# Patient Record
Sex: MALE | Race: WHITE
[De-identification: names, ages, dates, MRNs, and addresses within clinical notes are randomized per-mention and may not be internally consistent; named-entity substitution may affect disease eponyms.]

---

## 2017-12-20 ENCOUNTER — HOSPITAL ENCOUNTER (OUTPATIENT)
Dept: HOSPITAL 53 - M WUC | Age: 57
End: 2017-12-20
Attending: FAMILY MEDICINE
Payer: COMMERCIAL

## 2017-12-20 DIAGNOSIS — N40.0: ICD-10-CM

## 2017-12-20 DIAGNOSIS — Z98.84: ICD-10-CM

## 2017-12-20 DIAGNOSIS — E05.90: ICD-10-CM

## 2017-12-20 DIAGNOSIS — D50.9: ICD-10-CM

## 2017-12-20 DIAGNOSIS — E55.9: Primary | ICD-10-CM

## 2017-12-20 LAB
ALBUMIN SERPL BCG-MCNC: 3.8 GM/DL (ref 3.2–5.2)
ALBUMIN/GLOB SERPL: 1 {RATIO} (ref 1–1.93)
ALP SERPL-CCNC: 59 U/L (ref 45–117)
ALT SERPL W P-5'-P-CCNC: 30 U/L (ref 12–78)
ANION GAP SERPL CALC-SCNC: 3 MEQ/L (ref 8–16)
AST SERPL-CCNC: 24 U/L (ref 7–37)
BASOPHILS # BLD AUTO: 0 10^3/UL (ref 0–0.2)
BASOPHILS NFR BLD AUTO: 0.9 % (ref 0–1)
BILIRUB SERPL-MCNC: 0.5 MG/DL (ref 0.2–1)
BUN SERPL-MCNC: 11 MG/DL (ref 7–18)
CALCIUM SERPL-MCNC: 8.8 MG/DL (ref 8.5–10.1)
CHLORIDE SERPL-SCNC: 103 MEQ/L (ref 98–107)
CHOLEST SERPL-MCNC: 154 MG/DL (ref ?–200)
CO2 SERPL-SCNC: 34 MEQ/L (ref 21–32)
CREAT SERPL-MCNC: 0.79 MG/DL (ref 0.7–1.3)
EOSINOPHIL # BLD AUTO: 0.1 10^3/UL (ref 0–0.5)
EOSINOPHIL NFR BLD AUTO: 1.1 % (ref 0–3)
ERYTHROCYTE [DISTWIDTH] IN BLOOD BY AUTOMATED COUNT: 13.3 % (ref 11.5–14.5)
EST. AVERAGE GLUCOSE BLD GHB EST-MCNC: 105 MG/DL (ref 60–110)
FOLATE SERPL-MCNC: > 24 NG/ML
GFR SERPL CREATININE-BSD FRML MDRD: > 60 ML/MIN/{1.73_M2} (ref 56–?)
GLUCOSE SERPL-MCNC: 79 MG/DL (ref 70–105)
IMM GRANULOCYTES NFR BLD: 0 % (ref 0–0)
IRON SATN MFR SERPL: 38.4 % (ref 19.7–50)
LYMPHOCYTES # BLD AUTO: 1.2 10^3/UL (ref 1.5–4.5)
LYMPHOCYTES NFR BLD AUTO: 26.1 % (ref 24–44)
MCH RBC QN AUTO: 30.9 PG (ref 27–33)
MCHC RBC AUTO-ENTMCNC: 33.3 G/DL (ref 32–36.5)
MCV RBC AUTO: 92.9 FL (ref 80–96)
MONOCYTES # BLD AUTO: 0.4 10^3/UL (ref 0–0.8)
MONOCYTES NFR BLD AUTO: 9.4 % (ref 0–5)
NEUTROPHILS # BLD AUTO: 2.9 10^3/UL (ref 1.8–7.7)
NEUTROPHILS NFR BLD AUTO: 62.5 % (ref 36–66)
NRBC BLD AUTO-RTO: 0 % (ref 0–0)
PLATELET # BLD AUTO: 170 10^3/UL (ref 150–450)
POTASSIUM SERPL-SCNC: 4.7 MEQ/L (ref 3.5–5.1)
PROT SERPL-MCNC: 7.6 GM/DL (ref 6.4–8.2)
SODIUM SERPL-SCNC: 140 MEQ/L (ref 136–145)
T4 FREE SERPL-MCNC: 1.04 NG/DL (ref 0.76–1.46)
TIBC SERPL-MCNC: 318 UG/DL (ref 250–450)
TRIGL SERPL-MCNC: 109 MG/DL (ref ?–150)
VIT B12 SERPL-MCNC: 652 PG/ML
WBC # BLD AUTO: 4.7 10^3/UL (ref 4–10)

## 2017-12-20 PROCEDURE — 84443 ASSAY THYROID STIM HORMONE: CPT

## 2017-12-20 PROCEDURE — 85025 COMPLETE CBC W/AUTO DIFF WBC: CPT

## 2017-12-20 PROCEDURE — 83550 IRON BINDING TEST: CPT

## 2017-12-20 PROCEDURE — 82607 VITAMIN B-12: CPT

## 2017-12-20 PROCEDURE — 82306 VITAMIN D 25 HYDROXY: CPT

## 2017-12-20 PROCEDURE — 36415 COLL VENOUS BLD VENIPUNCTURE: CPT

## 2017-12-20 PROCEDURE — 80061 LIPID PANEL: CPT

## 2017-12-20 PROCEDURE — 84439 ASSAY OF FREE THYROXINE: CPT

## 2017-12-20 PROCEDURE — 82746 ASSAY OF FOLIC ACID SERUM: CPT

## 2017-12-20 PROCEDURE — 80053 COMPREHEN METABOLIC PANEL: CPT

## 2017-12-20 PROCEDURE — 83036 HEMOGLOBIN GLYCOSYLATED A1C: CPT

## 2018-02-06 ENCOUNTER — HOSPITAL ENCOUNTER (OUTPATIENT)
Dept: HOSPITAL 53 - M LABDRAW1 | Age: 58
End: 2018-02-06
Attending: FAMILY MEDICINE
Payer: COMMERCIAL

## 2018-02-06 DIAGNOSIS — R10.11: Primary | ICD-10-CM

## 2018-02-06 LAB
ALBUMIN/GLOBULIN RATIO: 1.11 (ref 1–1.93)
ALBUMIN: 3.9 GM/DL (ref 3.2–5.2)
ALKALINE PHOSPHATASE: 62 U/L (ref 45–117)
ALT SERPL W P-5'-P-CCNC: 27 U/L (ref 12–78)
ANION GAP: 7 MEQ/L (ref 8–16)
APPEARANCE, URINE: (no result)
AST SERPL-CCNC: 23 U/L (ref 7–37)
BACTERIA UR QL AUTO: (no result)
BASO #: 0 10^3/UL (ref 0–0.2)
BASO %: 0.8 % (ref 0–1)
BILIRUBIN, URINE AUTO: NEGATIVE
BILIRUBIN,TOTAL: 0.4 MG/DL (ref 0.2–1)
BLOOD UREA NITROGEN: 10 MG/DL (ref 7–18)
BLOOD, URINE BLOOD: NEGATIVE
CALCIUM LEVEL: 8.5 MG/DL (ref 8.5–10.1)
CARBON DIOXIDE LEVEL: 27 MEQ/L (ref 21–32)
CHLORIDE LEVEL: 105 MEQ/L (ref 98–107)
CREATININE FOR GFR: 1 MG/DL (ref 0.7–1.3)
EOS #: 0.1 10^3/UL (ref 0–0.5)
EOSINOPHIL NFR BLD AUTO: 1.2 % (ref 0–3)
GFR SERPL CREATININE-BSD FRML MDRD: > 60 ML/MIN/{1.73_M2} (ref 56–?)
GLUCOSE, FASTING: 125 MG/DL (ref 70–100)
GLUCOSE, URINE (UA) AUTO: NEGATIVE MG/DL
HEMATOCRIT: 43.7 % (ref 42–52)
HEMOGLOBIN: 14.7 G/DL (ref 14–18)
IMMATURE GRANULOCYTE #: 0 10^3/UL (ref 0–0)
IMMATURE GRANULOCYTE %: 0 % (ref 0–0)
KETONE, URINE AUTO: NEGATIVE MG/DL
LEUKOCYTE ESTERASE UR QL STRIP.AUTO: NEGATIVE
LYMPH #: 1.1 10^3/UL (ref 1.5–4.5)
LYMPH %: 21.9 % (ref 24–44)
MEAN CORPUSCULAR HEMOGLOBIN: 31.3 PG (ref 27–33)
MEAN CORPUSCULAR HGB CONC: 33.6 G/DL (ref 32–36.5)
MEAN CORPUSCULAR VOLUME: 93.2 FL (ref 80–96)
MONO #: 0.5 10^3/UL (ref 0–0.8)
MONO %: 8.7 % (ref 0–5)
NEUTROPHILS #: 3.5 10^3/UL (ref 1.8–7.7)
NEUTROPHILS %: 67.4 % (ref 36–66)
NITRITE, URINE AUTO: NEGATIVE
NRBC BLD AUTO-RTO: 0 % (ref 0–0)
PH,URINE: 6 UNITS (ref 5–9)
PLATELET COUNT, AUTOMATED: 170 10^3/UL (ref 150–450)
POTASSIUM SERUM: 4.4 MEQ/L (ref 3.5–5.1)
PROT UR QL STRIP.AUTO: NEGATIVE MG/DL
RBC, URINE AUTO: 1 /HPF (ref 0–3)
RED BLOOD COUNT: 4.69 10^6/UL (ref 4.3–6.1)
RED CELL DISTRIBUTION WIDTH: 13.3 % (ref 11.5–14.5)
SODIUM LEVEL: 139 MEQ/L (ref 136–145)
SPECIFIC GRAVITY URINE AUTO: 1 (ref 1–1.03)
SQUAMOUS #/AREA URNS AUTO: 0 /HPF (ref 0–6)
TOTAL PROTEIN: 7.4 GM/DL (ref 6.4–8.2)
UROBILINOGEN, URINE AUTO: 0.2 MG/DL (ref 0–2)
WBC, URINE AUTO: 0 /HPF (ref 0–3)
WHITE BLOOD COUNT: 5.2 10^3/UL (ref 4–10)

## 2018-12-19 ENCOUNTER — HOSPITAL ENCOUNTER (OUTPATIENT)
Dept: HOSPITAL 53 - M LABDRAW1 | Age: 58
End: 2018-12-19
Attending: PHYSICIAN ASSISTANT
Payer: COMMERCIAL

## 2018-12-19 DIAGNOSIS — D50.9: ICD-10-CM

## 2018-12-19 DIAGNOSIS — Z98.84: ICD-10-CM

## 2018-12-19 DIAGNOSIS — N40.0: ICD-10-CM

## 2018-12-19 DIAGNOSIS — Z00.00: Primary | ICD-10-CM

## 2018-12-19 LAB
ALBUMIN SERPL BCG-MCNC: 3.9 GM/DL (ref 3.2–5.2)
ALT SERPL W P-5'-P-CCNC: 25 U/L (ref 12–78)
BASOPHILS # BLD AUTO: 0 10^3/UL (ref 0–0.2)
BASOPHILS NFR BLD AUTO: 1 % (ref 0–1)
BILIRUB SERPL-MCNC: 0.5 MG/DL (ref 0.2–1)
BUN SERPL-MCNC: 11 MG/DL (ref 7–18)
CALCIUM SERPL-MCNC: 8.9 MG/DL (ref 8.5–10.1)
CHLORIDE SERPL-SCNC: 104 MEQ/L (ref 98–107)
CHOLEST SERPL-MCNC: 130 MG/DL (ref ?–200)
CHOLEST/HDLC SERPL: 3.33 {RATIO} (ref ?–5)
CO2 SERPL-SCNC: 31 MEQ/L (ref 21–32)
CREAT SERPL-MCNC: 0.78 MG/DL (ref 0.7–1.3)
EOSINOPHIL # BLD AUTO: 0.1 10^3/UL (ref 0–0.5)
EOSINOPHIL NFR BLD AUTO: 1.4 % (ref 0–3)
FOLATE SERPL-MCNC: > 24 NG/ML (ref 5.4–?)
GFR SERPL CREATININE-BSD FRML MDRD: > 60 ML/MIN/{1.73_M2} (ref 56–?)
GLUCOSE SERPL-MCNC: 87 MG/DL (ref 70–100)
HCT VFR BLD AUTO: 44.4 % (ref 42–52)
HDLC SERPL-MCNC: 39 MG/DL (ref 40–?)
HGB BLD-MCNC: 15.1 G/DL (ref 13.5–17.5)
IRON SATN MFR SERPL: 36.7 % (ref 19.7–50)
IRON SERPL-MCNC: 116 UG/DL (ref 65–175)
LDLC SERPL CALC-MCNC: 72 MG/DL (ref ?–100)
LYMPHOCYTES # BLD AUTO: 0.8 10^3/UL (ref 1.5–4.5)
LYMPHOCYTES NFR BLD AUTO: 18.5 % (ref 24–44)
MCH RBC QN AUTO: 31.4 PG (ref 27–33)
MCHC RBC AUTO-ENTMCNC: 34 G/DL (ref 32–36.5)
MCV RBC AUTO: 92.3 FL (ref 80–96)
MONOCYTES # BLD AUTO: 0.5 10^3/UL (ref 0–0.8)
MONOCYTES NFR BLD AUTO: 10.8 % (ref 0–5)
NEUTROPHILS # BLD AUTO: 2.8 10^3/UL (ref 1.8–7.7)
NEUTROPHILS NFR BLD AUTO: 68.3 % (ref 36–66)
NONHDLC SERPL-MCNC: 91 MG/DL
PLATELET # BLD AUTO: 167 10^3/UL (ref 150–450)
POTASSIUM SERPL-SCNC: 4.3 MEQ/L (ref 3.5–5.1)
PROT SERPL-MCNC: 7.2 GM/DL (ref 6.4–8.2)
RBC # BLD AUTO: 4.81 10^6/UL (ref 4.3–6.1)
SODIUM SERPL-SCNC: 141 MEQ/L (ref 136–145)
T4 FREE SERPL-MCNC: 1.08 NG/DL (ref 0.76–1.46)
TIBC SERPL-MCNC: 316 UG/DL (ref 250–450)
TRIGL SERPL-MCNC: 94 MG/DL (ref ?–150)
TSH SERPL DL<=0.005 MIU/L-ACNC: 0.17 UIU/ML (ref 0.36–3.74)
VIT B12 SERPL-MCNC: 594 PG/ML (ref 247–911)
WBC # BLD AUTO: 4.2 10^3/UL (ref 4–10)

## 2018-12-19 PROCEDURE — 80061 LIPID PANEL: CPT

## 2018-12-19 PROCEDURE — 84443 ASSAY THYROID STIM HORMONE: CPT

## 2018-12-19 PROCEDURE — 80053 COMPREHEN METABOLIC PANEL: CPT

## 2018-12-19 PROCEDURE — 82746 ASSAY OF FOLIC ACID SERUM: CPT

## 2018-12-19 PROCEDURE — 36415 COLL VENOUS BLD VENIPUNCTURE: CPT

## 2018-12-19 PROCEDURE — 83550 IRON BINDING TEST: CPT

## 2018-12-19 PROCEDURE — 85025 COMPLETE CBC W/AUTO DIFF WBC: CPT

## 2018-12-19 PROCEDURE — 82607 VITAMIN B-12: CPT

## 2018-12-19 PROCEDURE — 84439 ASSAY OF FREE THYROXINE: CPT

## 2018-12-19 PROCEDURE — 84207 ASSAY OF VITAMIN B-6: CPT

## 2019-03-04 ENCOUNTER — HOSPITAL ENCOUNTER (OUTPATIENT)
Dept: HOSPITAL 53 - M LABDRAW1 | Age: 59
End: 2019-03-04
Attending: PHYSICIAN ASSISTANT
Payer: COMMERCIAL

## 2019-03-04 DIAGNOSIS — E03.9: Primary | ICD-10-CM

## 2019-03-04 LAB
T4 FREE SERPL-MCNC: 1.16 NG/DL (ref 0.76–1.46)
TSH SERPL DL<=0.005 MIU/L-ACNC: 0.17 UIU/ML (ref 0.36–3.74)

## 2019-04-09 ENCOUNTER — HOSPITAL ENCOUNTER (OUTPATIENT)
Dept: HOSPITAL 53 - M SFHCPLAZ | Age: 59
End: 2019-04-09
Attending: DERMATOLOGY
Payer: COMMERCIAL

## 2019-04-09 DIAGNOSIS — L91.8: Primary | ICD-10-CM

## 2019-06-25 ENCOUNTER — HOSPITAL ENCOUNTER (OUTPATIENT)
Dept: HOSPITAL 53 - M LABDRAW1 | Age: 59
End: 2019-06-25
Attending: PHYSICIAN ASSISTANT
Payer: COMMERCIAL

## 2019-06-25 DIAGNOSIS — E03.9: Primary | ICD-10-CM

## 2019-06-25 LAB
T4 FREE SERPL-MCNC: 1.11 NG/DL (ref 0.76–1.46)
TSH SERPL DL<=0.005 MIU/L-ACNC: 0.21 UIU/ML (ref 0.36–3.74)

## 2019-08-20 ENCOUNTER — HOSPITAL ENCOUNTER (OUTPATIENT)
Dept: HOSPITAL 53 - M LABDRAW1 | Age: 59
End: 2019-08-20
Attending: PHYSICIAN ASSISTANT
Payer: COMMERCIAL

## 2019-08-20 DIAGNOSIS — E03.9: Primary | ICD-10-CM

## 2019-08-20 LAB
T4 FREE SERPL-MCNC: 1.11 NG/DL (ref 0.76–1.46)
TSH SERPL DL<=0.005 MIU/L-ACNC: 0.1 UIU/ML (ref 0.36–3.74)

## 2020-01-02 ENCOUNTER — HOSPITAL ENCOUNTER (OUTPATIENT)
Dept: HOSPITAL 53 - M LABDRAW1 | Age: 60
End: 2020-01-02
Attending: PHYSICIAN ASSISTANT
Payer: COMMERCIAL

## 2020-01-02 DIAGNOSIS — N40.0: ICD-10-CM

## 2020-01-02 DIAGNOSIS — E05.90: ICD-10-CM

## 2020-01-02 DIAGNOSIS — Z98.84: ICD-10-CM

## 2020-01-02 DIAGNOSIS — D50.9: Primary | ICD-10-CM

## 2020-01-02 LAB
ALBUMIN SERPL BCG-MCNC: 3.7 GM/DL (ref 3.2–5.2)
ALT SERPL W P-5'-P-CCNC: 26 U/L (ref 12–78)
BASOPHILS # BLD AUTO: 0 10^3/UL (ref 0–0.2)
BASOPHILS NFR BLD AUTO: 0.5 % (ref 0–1)
BILIRUB SERPL-MCNC: 0.8 MG/DL (ref 0.2–1)
BUN SERPL-MCNC: 13 MG/DL (ref 7–18)
CALCIUM SERPL-MCNC: 9.2 MG/DL (ref 8.5–10.1)
CHLORIDE SERPL-SCNC: 106 MEQ/L (ref 98–107)
CHOLEST SERPL-MCNC: 139 MG/DL (ref ?–200)
CHOLEST/HDLC SERPL: 3.86 {RATIO} (ref ?–5)
CO2 SERPL-SCNC: 30 MEQ/L (ref 21–32)
CREAT SERPL-MCNC: 0.85 MG/DL (ref 0.7–1.3)
EOSINOPHIL # BLD AUTO: 0.1 10^3/UL (ref 0–0.5)
EOSINOPHIL NFR BLD AUTO: 1.4 % (ref 0–3)
FERRITIN SERPL-MCNC: 50 NG/ML (ref 26–388)
FOLATE SERPL-MCNC: 23.8 NG/ML
GFR SERPL CREATININE-BSD FRML MDRD: > 60 ML/MIN/{1.73_M2} (ref 56–?)
GLUCOSE SERPL-MCNC: 85 MG/DL (ref 70–100)
HCT VFR BLD AUTO: 46.4 % (ref 42–52)
HDLC SERPL-MCNC: 36 MG/DL (ref 40–?)
HGB BLD-MCNC: 15.1 G/DL (ref 13.5–17.5)
IRON SATN MFR SERPL: 46.7 % (ref 19.7–50)
IRON SERPL-MCNC: 150 UG/DL (ref 65–175)
LDLC SERPL CALC-MCNC: 82 MG/DL (ref ?–100)
LYMPHOCYTES # BLD AUTO: 1.1 10^3/UL (ref 1.5–5)
LYMPHOCYTES NFR BLD AUTO: 25.1 % (ref 24–44)
MCH RBC QN AUTO: 31.2 PG (ref 27–33)
MCHC RBC AUTO-ENTMCNC: 32.5 G/DL (ref 32–36.5)
MCV RBC AUTO: 95.9 FL (ref 80–96)
MONOCYTES # BLD AUTO: 0.4 10^3/UL (ref 0–0.8)
MONOCYTES NFR BLD AUTO: 9.3 % (ref 0–5)
NEUTROPHILS # BLD AUTO: 2.7 10^3/UL (ref 1.5–8.5)
NEUTROPHILS NFR BLD AUTO: 63.5 % (ref 36–66)
NONHDLC SERPL-MCNC: 103 MG/DL
PLATELET # BLD AUTO: 159 10^3/UL (ref 150–450)
POTASSIUM SERPL-SCNC: 4.3 MEQ/L (ref 3.5–5.1)
PROT SERPL-MCNC: 7.2 GM/DL (ref 6.4–8.2)
RBC # BLD AUTO: 4.84 10^6/UL (ref 4.3–6.1)
SODIUM SERPL-SCNC: 141 MEQ/L (ref 136–145)
T4 FREE SERPL-MCNC: 1.06 NG/DL (ref 0.76–1.46)
TIBC SERPL-MCNC: 321 UG/DL (ref 250–450)
TRIGL SERPL-MCNC: 106 MG/DL (ref ?–150)
TSH SERPL DL<=0.005 MIU/L-ACNC: 0.17 UIU/ML (ref 0.36–3.74)
VIT B12 SERPL-MCNC: 529 PG/ML
WBC # BLD AUTO: 4.2 10^3/UL (ref 4–10)

## 2020-01-02 PROCEDURE — 80061 LIPID PANEL: CPT

## 2020-01-02 PROCEDURE — 82607 VITAMIN B-12: CPT

## 2020-01-02 PROCEDURE — 82746 ASSAY OF FOLIC ACID SERUM: CPT

## 2020-01-02 PROCEDURE — 83550 IRON BINDING TEST: CPT

## 2020-01-02 PROCEDURE — 85025 COMPLETE CBC W/AUTO DIFF WBC: CPT

## 2020-01-02 PROCEDURE — 84439 ASSAY OF FREE THYROXINE: CPT

## 2020-01-02 PROCEDURE — 82728 ASSAY OF FERRITIN: CPT

## 2020-01-02 PROCEDURE — 36415 COLL VENOUS BLD VENIPUNCTURE: CPT

## 2020-01-02 PROCEDURE — 84443 ASSAY THYROID STIM HORMONE: CPT

## 2020-01-02 PROCEDURE — 80053 COMPREHEN METABOLIC PANEL: CPT

## 2021-06-16 ENCOUNTER — HOSPITAL ENCOUNTER (OUTPATIENT)
Dept: HOSPITAL 53 - M WUC | Age: 61
End: 2021-06-16
Attending: NURSE PRACTITIONER
Payer: COMMERCIAL

## 2021-06-16 DIAGNOSIS — E05.90: Primary | ICD-10-CM

## 2021-06-16 LAB
T4 FREE SERPL-MCNC: 1.16 NG/DL (ref 0.76–1.46)
TSH SERPL DL<=0.005 MIU/L-ACNC: 0.05 UIU/ML (ref 0.36–3.74)

## 2021-06-24 ENCOUNTER — HOSPITAL ENCOUNTER (OUTPATIENT)
Dept: HOSPITAL 53 - M WUC | Age: 61
End: 2021-06-24
Attending: INTERNAL MEDICINE
Payer: COMMERCIAL

## 2021-06-24 DIAGNOSIS — E05.00: Primary | ICD-10-CM

## 2021-08-10 ENCOUNTER — HOSPITAL ENCOUNTER (OUTPATIENT)
Dept: HOSPITAL 53 - M LAB REF | Age: 61
End: 2021-08-10
Attending: NURSE PRACTITIONER
Payer: COMMERCIAL

## 2021-08-10 DIAGNOSIS — E05.00: Primary | ICD-10-CM

## 2021-08-10 LAB
T3 SERPL-MCNC: 128.5 NG/DL (ref 60–181)
T4 FREE SERPL-MCNC: 1.3 NG/DL (ref 0.76–1.46)
TSH SERPL DL<=0.005 MIU/L-ACNC: 0.05 UIU/ML (ref 0.36–3.74)

## 2021-08-18 ENCOUNTER — HOSPITAL ENCOUNTER (OUTPATIENT)
Dept: HOSPITAL 53 - M RAD | Age: 61
End: 2021-08-18
Attending: NURSE PRACTITIONER
Payer: COMMERCIAL

## 2021-08-18 DIAGNOSIS — E05.90: Primary | ICD-10-CM

## 2021-08-18 PROCEDURE — 78012 THYROID UPTAKE MEASUREMENT: CPT

## 2021-08-19 NOTE — REP
INDICATION:

THYROTOXICOSIS.



COMPARISON:

None.



TECHNIQUE/RADIOTRACER AND DOSE:

Following the oral administration of 368 uCi iodine 123 as sodium iodine, thyroid

uptake and scan is performed.



FINDINGS:

The 24 hour uptake is 11.47%, below the normal range of 25-35%.



Thyroid scan shows both lobes of the thyroid to be normal in size.  There is

relatively homogeneous radiotracer distribution bilaterally.  No focal hot or cold

nodule is seen.



IMPRESSION:

Decreased 24 hour uptake.  No focal hot or cold nodule.





<Electronically signed by Abundio Crocker > 08/19/21 [id number]

## 2021-12-13 ENCOUNTER — HOSPITAL ENCOUNTER (OUTPATIENT)
Dept: HOSPITAL 53 - M WUC | Age: 61
End: 2021-12-13
Attending: NURSE PRACTITIONER
Payer: COMMERCIAL

## 2021-12-13 DIAGNOSIS — E05.00: Primary | ICD-10-CM

## 2021-12-13 LAB
T3 SERPL-MCNC: 105.4 NG/DL (ref 60–181)
T4 FREE SERPL-MCNC: 1.14 NG/DL (ref 0.76–1.46)
TSH SERPL DL<=0.005 MIU/L-ACNC: 0.13 UIU/ML (ref 0.36–3.74)

## 2022-01-05 ENCOUNTER — HOSPITAL ENCOUNTER (OUTPATIENT)
Dept: HOSPITAL 53 - M WUC | Age: 62
End: 2022-01-05
Attending: NURSE PRACTITIONER
Payer: COMMERCIAL

## 2022-01-05 DIAGNOSIS — Z98.84: ICD-10-CM

## 2022-01-05 DIAGNOSIS — N40.0: Primary | ICD-10-CM

## 2022-01-05 LAB
25(OH)D3 SERPL-MCNC: 45.1 NG/ML (ref 30–100)
ALBUMIN SERPL BCG-MCNC: 3.7 GM/DL (ref 3.2–5.2)
ALT SERPL W P-5'-P-CCNC: 34 U/L (ref 12–78)
BASOPHILS # BLD AUTO: 0 10^3/UL (ref 0–0.2)
BASOPHILS NFR BLD AUTO: 0.5 % (ref 0–1)
BILIRUB SERPL-MCNC: 0.5 MG/DL (ref 0.2–1)
BUN SERPL-MCNC: 10 MG/DL (ref 7–18)
CALCIUM SERPL-MCNC: 9.1 MG/DL (ref 8.8–10.2)
CHLORIDE SERPL-SCNC: 106 MEQ/L (ref 98–107)
CHOLEST SERPL-MCNC: 121 MG/DL (ref ?–200)
CHOLEST/HDLC SERPL: 3.78 {RATIO} (ref ?–5)
CO2 SERPL-SCNC: 33 MEQ/L (ref 21–32)
CREAT SERPL-MCNC: 0.75 MG/DL (ref 0.7–1.3)
EOSINOPHIL # BLD AUTO: 0 10^3/UL (ref 0–0.5)
EOSINOPHIL NFR BLD AUTO: 0.5 % (ref 0–3)
EST. AVERAGE GLUCOSE BLD GHB EST-MCNC: 105 MG/DL (ref 60–110)
FERRITIN SERPL-MCNC: 124 NG/ML (ref 26–388)
FOLATE SERPL-MCNC: 16 NG/ML
GFR SERPL CREATININE-BSD FRML MDRD: > 60 ML/MIN/{1.73_M2} (ref 49–?)
GLUCOSE SERPL-MCNC: 87 MG/DL (ref 70–100)
HCT VFR BLD AUTO: 43.6 % (ref 42–52)
HDLC SERPL-MCNC: 32 MG/DL (ref 40–?)
HGB BLD-MCNC: 14.4 G/DL (ref 13.5–17.5)
IRON SATN MFR SERPL: 50.4 % (ref 19.7–50)
IRON SERPL-MCNC: 140 UG/DL (ref 65–175)
LDLC SERPL CALC-MCNC: 64 MG/DL (ref ?–100)
LYMPHOCYTES # BLD AUTO: 1.6 10^3/UL (ref 1.5–5)
LYMPHOCYTES NFR BLD AUTO: 38.3 % (ref 24–44)
MCH RBC QN AUTO: 30.7 PG (ref 27–33)
MCHC RBC AUTO-ENTMCNC: 33 G/DL (ref 32–36.5)
MCV RBC AUTO: 93 FL (ref 80–96)
MONOCYTES # BLD AUTO: 0.4 10^3/UL (ref 0–0.8)
MONOCYTES NFR BLD AUTO: 8.3 % (ref 2–8)
NEUTROPHILS # BLD AUTO: 2.2 10^3/UL (ref 1.5–8.5)
NEUTROPHILS NFR BLD AUTO: 52.2 % (ref 36–66)
NONHDLC SERPL-MCNC: 89 MG/DL
PLATELET # BLD AUTO: 140 10^3/UL (ref 150–450)
POTASSIUM SERPL-SCNC: 4.5 MEQ/L (ref 3.5–5.1)
PROT SERPL-MCNC: 7.2 GM/DL (ref 6.4–8.2)
RBC # BLD AUTO: 4.69 10^6/UL (ref 4.3–6.1)
SODIUM SERPL-SCNC: 140 MEQ/L (ref 136–145)
TIBC SERPL-MCNC: 278 UG/DL (ref 250–450)
TRIGL SERPL-MCNC: 126 MG/DL (ref ?–150)
VIT B12 SERPL-MCNC: 585 PG/ML
WBC # BLD AUTO: 4.2 10^3/UL (ref 4–10)

## 2022-01-05 PROCEDURE — 36415 COLL VENOUS BLD VENIPUNCTURE: CPT

## 2022-01-05 PROCEDURE — 80061 LIPID PANEL: CPT

## 2022-01-05 PROCEDURE — 82728 ASSAY OF FERRITIN: CPT

## 2022-01-05 PROCEDURE — 80053 COMPREHEN METABOLIC PANEL: CPT

## 2022-01-05 PROCEDURE — 83036 HEMOGLOBIN GLYCOSYLATED A1C: CPT

## 2022-01-05 PROCEDURE — 85025 COMPLETE CBC W/AUTO DIFF WBC: CPT

## 2022-01-05 PROCEDURE — 83550 IRON BINDING TEST: CPT

## 2022-01-05 PROCEDURE — 82607 VITAMIN B-12: CPT

## 2022-01-05 PROCEDURE — 82746 ASSAY OF FOLIC ACID SERUM: CPT

## 2022-01-05 PROCEDURE — 82306 VITAMIN D 25 HYDROXY: CPT

## 2022-12-16 ENCOUNTER — HOSPITAL ENCOUNTER (OUTPATIENT)
Dept: HOSPITAL 53 - M WUC | Age: 62
End: 2022-12-16
Attending: NURSE PRACTITIONER
Payer: COMMERCIAL

## 2022-12-16 DIAGNOSIS — E05.90: ICD-10-CM

## 2022-12-16 DIAGNOSIS — Z98.84: ICD-10-CM

## 2022-12-16 DIAGNOSIS — N40.0: Primary | ICD-10-CM

## 2022-12-16 LAB
25(OH)D3 SERPL-MCNC: 50.5 NG/ML (ref 20–100)
ALBUMIN SERPL BCG-MCNC: 3.8 G/DL (ref 3.2–5.2)
ALP SERPL-CCNC: 76 U/L (ref 46–116)
ALT SERPL W P-5'-P-CCNC: 30 U/L (ref 7–40)
AST SERPL-CCNC: 26 U/L (ref ?–34)
BASOPHILS # BLD AUTO: 0 10^3/UL (ref 0–0.2)
BASOPHILS NFR BLD AUTO: 0.4 % (ref 0–1)
BILIRUB SERPL-MCNC: 0.8 MG/DL (ref 0.3–1.2)
BUN SERPL-MCNC: 13 MG/DL (ref 9–23)
CALCIUM SERPL-MCNC: 9.1 MG/DL (ref 8.3–10.6)
CHLORIDE SERPL-SCNC: 103 MMOL/L (ref 98–107)
CHOLEST SERPL-MCNC: 143 MG/DL (ref ?–200)
CHOLEST/HDLC SERPL: 4.73 {RATIO} (ref ?–5)
CO2 SERPL-SCNC: 30 MMOL/L (ref 20–31)
CREAT SERPL-MCNC: 0.85 MG/DL (ref 0.7–1.3)
EOSINOPHIL # BLD AUTO: 0 10^3/UL (ref 0–0.5)
EOSINOPHIL NFR BLD AUTO: 0.5 % (ref 0–3)
EST. AVERAGE GLUCOSE BLD GHB EST-MCNC: 97 MG/DL (ref 60–110)
FERRITIN SERPL-MCNC: 78.4 NG/ML (ref 10.5–307.3)
FOLATE SERPL-MCNC: 17.13 NG/ML (ref 5.4–?)
GFR SERPL CREATININE-BSD FRML MDRD: > 60 ML/MIN/{1.73_M2} (ref 49–?)
GLUCOSE SERPL-MCNC: 86 MG/DL (ref 74–106)
HCT VFR BLD AUTO: 43.4 % (ref 42–52)
HDLC SERPL-MCNC: 30.2 MG/DL (ref 40–?)
HGB BLD-MCNC: 14.5 G/DL (ref 13.5–17.5)
IRON SATN MFR SERPL: 52.8 % (ref 19.7–50)
IRON SERPL-MCNC: 162 UG/DL (ref 65–175)
LDLC SERPL CALC-MCNC: 86 MG/DL (ref ?–100)
LYMPHOCYTES # BLD AUTO: 2.2 10^3/UL (ref 1.5–5)
LYMPHOCYTES NFR BLD AUTO: 39.2 % (ref 24–44)
MCH RBC QN AUTO: 31.3 PG (ref 27–33)
MCHC RBC AUTO-ENTMCNC: 33.4 G/DL (ref 32–36.5)
MCV RBC AUTO: 93.7 FL (ref 80–96)
MONOCYTES # BLD AUTO: 0.5 10^3/UL (ref 0–0.8)
MONOCYTES NFR BLD AUTO: 8.2 % (ref 2–8)
NEUTROPHILS # BLD AUTO: 2.9 10^3/UL (ref 1.5–8.5)
NEUTROPHILS NFR BLD AUTO: 51.5 % (ref 36–66)
NONHDLC SERPL-MCNC: 113 MG/DL
PLATELET # BLD AUTO: 156 10^3/UL (ref 150–450)
POTASSIUM SERPL-SCNC: 3.8 MMOL/L (ref 3.5–5.1)
PROT SERPL-MCNC: 7.5 G/DL (ref 5.7–8.2)
RBC # BLD AUTO: 4.63 10^6/UL (ref 4.3–6.1)
SODIUM SERPL-SCNC: 138 MMOL/L (ref 136–145)
T4 FREE SERPL-MCNC: 1.23 NG/DL (ref 0.89–1.76)
TIBC SERPL-MCNC: 307 UG/DL (ref 250–425)
TRIGL SERPL-MCNC: 134 MG/DL (ref ?–150)
TSH SERPL DL<=0.005 MIU/L-ACNC: 0.22 UIU/ML (ref 0.55–4.78)
VIT B12 SERPL-MCNC: 504 PG/ML (ref 211–911)
WBC # BLD AUTO: 5.7 10^3/UL (ref 4–10)

## 2022-12-16 PROCEDURE — 80053 COMPREHEN METABOLIC PANEL: CPT

## 2022-12-16 PROCEDURE — 83036 HEMOGLOBIN GLYCOSYLATED A1C: CPT

## 2022-12-16 PROCEDURE — 80061 LIPID PANEL: CPT

## 2022-12-16 PROCEDURE — 85025 COMPLETE CBC W/AUTO DIFF WBC: CPT

## 2022-12-16 PROCEDURE — 82746 ASSAY OF FOLIC ACID SERUM: CPT

## 2022-12-16 PROCEDURE — 83550 IRON BINDING TEST: CPT

## 2022-12-16 PROCEDURE — 82728 ASSAY OF FERRITIN: CPT

## 2022-12-16 PROCEDURE — 36415 COLL VENOUS BLD VENIPUNCTURE: CPT

## 2022-12-16 PROCEDURE — 82306 VITAMIN D 25 HYDROXY: CPT

## 2022-12-16 PROCEDURE — 84443 ASSAY THYROID STIM HORMONE: CPT

## 2022-12-16 PROCEDURE — 82607 VITAMIN B-12: CPT

## 2022-12-16 PROCEDURE — 84439 ASSAY OF FREE THYROXINE: CPT

## 2023-09-11 ENCOUNTER — HOSPITAL ENCOUNTER (OUTPATIENT)
Dept: HOSPITAL 53 - M ONCM | Age: 63
End: 2023-09-11
Attending: RADIOLOGY
Payer: SELF-PAY

## 2023-09-11 DIAGNOSIS — Z12.5: Primary | ICD-10-CM

## 2024-12-30 ENCOUNTER — HOSPITAL ENCOUNTER (OUTPATIENT)
Dept: HOSPITAL 53 - M WUC | Age: 64
End: 2024-12-30
Attending: NURSE PRACTITIONER
Payer: COMMERCIAL

## 2024-12-30 DIAGNOSIS — N40.0: ICD-10-CM

## 2024-12-30 DIAGNOSIS — E05.90: ICD-10-CM

## 2024-12-30 DIAGNOSIS — Z98.84: ICD-10-CM

## 2024-12-30 DIAGNOSIS — Z00.00: Primary | ICD-10-CM

## 2024-12-30 LAB
25(OH)D3 SERPL-MCNC: 45.4 NG/ML (ref 20–100)
ALBUMIN SERPL BCG-MCNC: 3.8 G/DL (ref 3.2–5.2)
ALP SERPL-CCNC: 76 U/L (ref 40–129)
ALT SERPL W P-5'-P-CCNC: 26 U/L (ref 7–40)
AST SERPL-CCNC: 23 U/L (ref ?–34)
BASOPHILS # BLD AUTO: 0 10^3/UL (ref 0–0.2)
BASOPHILS NFR BLD AUTO: 0.6 % (ref 0–1)
BILIRUB SERPL-MCNC: 0.5 MG/DL (ref 0.3–1.2)
BUN SERPL-MCNC: 14 MG/DL (ref 9–23)
CALCIUM SERPL-MCNC: 9.4 MG/DL (ref 8.3–10.6)
CHLORIDE SERPL-SCNC: 104 MMOL/L (ref 98–107)
CHOLEST SERPL-MCNC: 134 MG/DL (ref ?–200)
CHOLEST/HDLC SERPL: 3.79 {RATIO} (ref ?–5)
CO2 SERPL-SCNC: 30 MMOL/L (ref 20–31)
CREAT SERPL-MCNC: 0.78 MG/DL (ref 0.7–1.3)
EOSINOPHIL # BLD AUTO: 0.1 10^3/UL (ref 0–0.5)
EOSINOPHIL NFR BLD AUTO: 1.1 % (ref 0–3)
EST. AVERAGE GLUCOSE BLD GHB EST-MCNC: 97 MG/DL (ref 60–110)
FERRITIN SERPL-MCNC: 73.2 NG/ML (ref 10.5–307.3)
FOLATE SERPL-MCNC: 21.3 NG/ML (ref 5.4–?)
GFR SERPL CREATININE-BSD FRML MDRD: > 60 ML/MIN/{1.73_M2} (ref 49–?)
GLUCOSE SERPL-MCNC: 91 MG/DL (ref 74–106)
HCT VFR BLD AUTO: 41.2 % (ref 42–52)
HDLC SERPL-MCNC: 35.3 MG/DL (ref 40–?)
HGB BLD-MCNC: 13.8 G/DL (ref 13.5–17.5)
IRON SATN MFR SERPL: 43 % (ref 19.7–50)
IRON SERPL-MCNC: 130 UG/DL (ref 65–175)
LDLC SERPL CALC-MCNC: 76.1 MG/DL (ref ?–100)
LYMPHOCYTES # BLD AUTO: 1.7 10^3/UL (ref 1.5–5)
LYMPHOCYTES NFR BLD AUTO: 35.2 % (ref 24–44)
MCH RBC QN AUTO: 30.9 PG (ref 27–33)
MCHC RBC AUTO-ENTMCNC: 33.5 G/DL (ref 32–36.5)
MCV RBC AUTO: 92.2 FL (ref 80–96)
MONOCYTES # BLD AUTO: 0.5 10^3/UL (ref 0–0.8)
MONOCYTES NFR BLD AUTO: 10.2 % (ref 2–8)
NEUTROPHILS # BLD AUTO: 2.5 10^3/UL (ref 1.5–8.5)
NEUTROPHILS NFR BLD AUTO: 52.5 % (ref 36–66)
NONHDLC SERPL-MCNC: 98.7 MG/DL
PLATELET # BLD AUTO: 134 10^3/UL (ref 150–450)
POTASSIUM SERPL-SCNC: 4.2 MMOL/L (ref 3.5–5.1)
PROT SERPL-MCNC: 7.2 G/DL (ref 5.7–8.2)
PSA SERPL-MCNC: 2.14 NG/ML (ref ?–4)
RBC # BLD AUTO: 4.47 10^6/UL (ref 4.3–6.1)
SODIUM SERPL-SCNC: 140 MMOL/L (ref 136–145)
T4 FREE SERPL-MCNC: 1.21 NG/DL (ref 0.89–1.76)
TIBC SERPL-MCNC: 302 UG/DL (ref 250–425)
TRIGL SERPL-MCNC: 113 MG/DL (ref ?–150)
TSH SERPL DL<=0.005 MIU/L-ACNC: 0.24 UIU/ML (ref 0.55–4.78)
VIT B12 SERPL-MCNC: 840 PG/ML (ref 211–911)
WBC # BLD AUTO: 4.7 10^3/UL (ref 4–10)

## 2024-12-30 PROCEDURE — 82728 ASSAY OF FERRITIN: CPT

## 2024-12-30 PROCEDURE — 85025 COMPLETE CBC W/AUTO DIFF WBC: CPT

## 2024-12-30 PROCEDURE — 82607 VITAMIN B-12: CPT

## 2024-12-30 PROCEDURE — 82746 ASSAY OF FOLIC ACID SERUM: CPT

## 2024-12-30 PROCEDURE — 83550 IRON BINDING TEST: CPT

## 2024-12-30 PROCEDURE — 83036 HEMOGLOBIN GLYCOSYLATED A1C: CPT

## 2024-12-30 PROCEDURE — 84439 ASSAY OF FREE THYROXINE: CPT

## 2024-12-30 PROCEDURE — 82306 VITAMIN D 25 HYDROXY: CPT

## 2024-12-30 PROCEDURE — 80053 COMPREHEN METABOLIC PANEL: CPT

## 2024-12-30 PROCEDURE — 84443 ASSAY THYROID STIM HORMONE: CPT

## 2024-12-30 PROCEDURE — 80061 LIPID PANEL: CPT

## 2024-12-30 PROCEDURE — 36415 COLL VENOUS BLD VENIPUNCTURE: CPT
